# Patient Record
(demographics unavailable — no encounter records)

---

## 2025-07-24 NOTE — ASSESSMENT
[FreeTextEntry1] : 44 year-old female with PMH pneumonia, Bronchiectasis.  She reports cough, persistent.   PFT - FEV/FVC 78, 95% of predicted. minimal obstruction  FENO - 14ppb  PFT - FEV/FVC 61, Severe COPD - July 2024   On exam lungs are clear, in NAD.  Care plan discussed - will continue daily Trelegy (100mcg/62.5mcg/25 mcg) 1 puff Daily for management of COPD/Bronciectasis, Montelukast daily. Follow-up in 3 months.

## 2025-07-24 NOTE — HISTORY OF PRESENT ILLNESS
[Never] : never [TextBox_4] : 44-year-old past medical history significant for nephrocalcinosis, PNA, Bronchiectasis.